# Patient Record
Sex: MALE | Race: WHITE | NOT HISPANIC OR LATINO | URBAN - NONMETROPOLITAN AREA
[De-identification: names, ages, dates, MRNs, and addresses within clinical notes are randomized per-mention and may not be internally consistent; named-entity substitution may affect disease eponyms.]

---

## 2023-07-25 ENCOUNTER — HOSPITAL ENCOUNTER (OUTPATIENT)
Dept: DATA CONVERSION | Facility: HOSPITAL | Age: 58
End: 2023-07-25
Attending: UROLOGY | Admitting: UROLOGY

## 2023-07-25 DIAGNOSIS — N20.0 CALCULUS OF KIDNEY: ICD-10-CM

## 2023-07-27 LAB
COMPLETE PATHOLOGY REPORT: NORMAL
CONVERTED CLINICAL DIAGNOSIS-HISTORY: NORMAL
CONVERTED FINAL DIAGNOSIS: NORMAL
CONVERTED FINAL REPORT PDF LINK TO COPY AND PASTE: NORMAL
CONVERTED GROSS DESCRIPTION: NORMAL

## 2023-08-01 ENCOUNTER — HOSPITAL ENCOUNTER (OUTPATIENT)
Dept: DATA CONVERSION | Facility: HOSPITAL | Age: 58
End: 2023-08-01
Attending: UROLOGY | Admitting: UROLOGY

## 2023-08-01 DIAGNOSIS — N13.30 UNSPECIFIED HYDRONEPHROSIS: ICD-10-CM

## 2023-08-01 DIAGNOSIS — N20.0 CALCULUS OF KIDNEY: ICD-10-CM

## 2023-08-07 LAB
CALCULI COMPOSITION: NORMAL
CALCULI DESCRIPTION: NORMAL
CALCULI MASS: 17 MG

## 2023-09-29 VITALS — WEIGHT: 183.64 LBS | BODY MASS INDEX: 28.82 KG/M2 | HEIGHT: 67 IN

## 2023-09-30 NOTE — H&P
History & Physical Reviewed:   I have reviewed the History and Physical dated:  24-Jul-2023   History and Physical reviewed and relevant findings noted. Patient examined to review pertinent physical  findings.: No significant changes   Home Medications Reviewed: no changes noted   Allergies Reviewed: no changes noted       ERAS (Enhanced Recovery After Surgery):  ·  ERAS Patient: no     Consent:   COVID-19 Consent:  ·  COVID-19 Risk Consent Surgeon has reviewed key risks related to the risk of misa COVID-19 and if they contract COVID-19 what the risks are.       Electronic Signatures:  Anil Larsen)  (Signed 25-Jul-2023 07:53)   Authored: History & Physical Reviewed, ERAS, Consent,  Note Completion      Last Updated: 25-Jul-2023 07:53 by Anil Larsen)

## 2023-09-30 NOTE — H&P
History & Physical Reviewed:   I have reviewed the History and Physical dated:  27-Jul-2023   History and Physical reviewed and relevant findings noted. Patient examined to review pertinent physical  findings.: No significant changes   Home Medications Reviewed: no changes noted   Allergies Reviewed: no changes noted       ERAS (Enhanced Recovery After Surgery):  ·  ERAS Patient: no     Consent:   COVID-19 Consent:  ·  COVID-19 Risk Consent Surgeon has reviewed key risks related to the risk of misa COVID-19 and if they contract COVID-19 what the risks are.       Electronic Signatures:  Anil Larsen)  (Signed 01-Aug-2023 07:37)   Authored: History & Physical Reviewed, ERAS, Consent,  Note Completion      Last Updated: 01-Aug-2023 07:37 by Anil Larsen)

## 2023-10-01 NOTE — OP NOTE
PROCEDURE DETAILS    Preoperative Diagnosis:  Calculus of left kidney, N20.0  Unspecified hydronephrosis, N13.30    Postoperative Diagnosis:  Calculus of left kidney, N20.0  Unspecified hydronephrosis, N13.30    Surgeon: Anil Larsen  Resident/Fellow/Other Assistant: None of these were associated with this case    Procedure:  1. L ESWL, CYSTO BILAT RPG BILAT URETEROSCOPY BILAT STENT removal    Anesthesia: No anesthesiologist associated with this case  Estimated Blood Loss: 0  Findings: see op note  Specimens(s) Collected: no,           Operative Report:   Preoperative diagnosis:  bilateral hydronephrosis, left renal stone  Postoperative diagnosis: Same  Procedure: cystoscopy with bilateral RPG and ureteroscopy and bilateral stent removal, left ESWL  Physician: ANDRES  Anesthesia: Gen.  Estimated blood loss: None  Indications and consent: The patient presents with known hydronephrosis. After the risks, benefits, alternatives, and indications of the procedure were explained to the patient they consented.  Procedure: The patient was brought to the operating room and placed on the table in the supine position. After adequate anesthesia was obtained, the patient was prepped and draped in the standard surgical fashion. First, the cystoscope was inserted into  the bladder. Formal cystoscopy was performed. The previously placed stent was seen and grasped and brought out. A guidewire was placed up the existing stent into the renal pelvis using X-Ray guidance. The stent was then removed.  the same procedure was  performed on the left. We then performed ureteroscopy with retrograde pyelogram. This did not show any evidence of mass or stone in the ureter. The retrograde pyelogram did not show hydronephrosis. a left ESWL was performed 2500 shocks were delivered..The  patient tolerated the procedure well and there no complications.    follow up 3 months with KUB                        Attestation:   Note Completion:  Attending  Attestation I performed the procedure without a resident         Electronic Signatures:  Anil Larsen (MD)  (Signed 01-Aug-2023 09:03)   Authored: Post-Operative Note, Chart Review, Note Completion      Last Updated: 01-Aug-2023 09:03 by Anil Larsen)

## 2023-10-02 NOTE — OP NOTE
PROCEDURE DETAILS    Preoperative Diagnosis:  Calculus of left kidney, N20.0    Postoperative Diagnosis:  Calculus of left kidney, N20.0    Surgeon: Anil Larsen  Resident/Fellow/Other Assistant: None of these were associated with this case    Procedure:  1. CYSTO BILAT RPG BILAT FLEXIBLE URETEROSCOPY W STONE MANIPULATION BILAT STENTS    Anesthesia: No anesthesiologist associated with this case  Estimated Blood Loss: 0  Findings: see op note  Specimens(s) Collected: no,           Operative Report:   Preoperative diagnosis:  Bilateral Flank Pain  Postoperative diagnosis: Same  Procedure: cystoscopy with bilateral RPG and ureteroscopy with stone extraction and Bilateral stent  placement  Physician: ANDRES  Anesthesia: Gen.  Estimated blood loss: None  Indications and consent: The patient presents with known stones and Flank Pain. After the risks, benefits, alternatives, and indications of the procedure were explained to the patient they consented.  Procedure: The patient was brought to the operating room and placed on the table in the supine position. After adequate anesthesia was obtained, the patient was prepped and draped in the standard surgical fashion. First, the cystoscope was inserted into  the bladder. Formal cystoscopy was performed.   We then performed  bilateral ureteroscopy with retrograde pyelograms. There was definite Left Hydro with renal stones. Ureteroscopy also showed a ureteral stone and this was basketed and sent for analysis.   Similar findings were found on the RIght but no obvious renal stones .  At this point  BILATERAL  stents were placed over the existing guidewires. This was done under fluoroscopic vision. The patient tolerated the procedure well and there no complications.    F/U WITH CT STONE STUDY  WIll definitely need F/U L ESWL WIll allow CT to evaluate right sided stone burden                        Attestation:   Note Completion:  Attending Attestation I performed the procedure  without a resident         Electronic Signatures:  Anil Larsen)  (Signed 25-Jul-2023 10:18)   Authored: Post-Operative Note, Chart Review, Note Completion      Last Updated: 25-Jul-2023 10:18 by Anil Larsen)

## 2023-10-06 ENCOUNTER — APPOINTMENT (OUTPATIENT)
Dept: URBAN - METROPOLITAN AREA CLINIC 204 | Age: 58
Setting detail: DERMATOLOGY
End: 2023-10-06

## 2023-10-06 DIAGNOSIS — L82.1 OTHER SEBORRHEIC KERATOSIS: ICD-10-CM

## 2023-10-06 DIAGNOSIS — L66.1 LICHEN PLANOPILARIS: ICD-10-CM

## 2023-10-06 DIAGNOSIS — D22 MELANOCYTIC NEVI: ICD-10-CM

## 2023-10-06 DIAGNOSIS — L57.8 OTHER SKIN CHANGES DUE TO CHRONIC EXPOSURE TO NONIONIZING RADIATION: ICD-10-CM

## 2023-10-06 PROBLEM — D22.5 MELANOCYTIC NEVI OF TRUNK: Status: ACTIVE | Noted: 2023-10-06

## 2023-10-06 PROCEDURE — OTHER PRESCRIPTION MEDICATION MANAGEMENT: OTHER

## 2023-10-06 PROCEDURE — OTHER COUNSELING: OTHER

## 2023-10-06 PROCEDURE — OTHER PRESCRIPTION: OTHER

## 2023-10-06 PROCEDURE — 99214 OFFICE O/P EST MOD 30 MIN: CPT

## 2023-10-06 PROCEDURE — OTHER MIPS QUALITY: OTHER

## 2023-10-06 RX ORDER — DOXYCYCLINE HYCLATE 100 MG/1
1 CAPSULE CAPSULE, GELATIN COATED ORAL TWICE A DAY
Qty: 60 | Refills: 5 | Status: ERX | COMMUNITY
Start: 2023-10-06

## 2023-10-06 RX ORDER — CLOBETASOL PROPIONATE 0.5 MG/ML
SOLUTION TOPICAL QHS
Qty: 50 | Refills: 3 | Status: ERX | COMMUNITY
Start: 2023-10-06

## 2023-10-06 RX ORDER — FLUOCINOLONE ACETONIDE 0.11 MG/ML
SMALL AMOUNT OIL TOPICAL
Qty: 118.28 | Refills: 5 | Status: ERX | COMMUNITY
Start: 2023-10-06

## 2023-10-06 RX ORDER — PREDNISONE 10 MG/1
TABLET ORAL ONCE A DAY
Qty: 35 | Refills: 0 | Status: ERX | COMMUNITY
Start: 2023-10-06

## 2023-10-06 ASSESSMENT — LOCATION ZONE DERM
LOCATION ZONE: SCALP
LOCATION ZONE: TRUNK

## 2023-10-06 ASSESSMENT — LOCATION SIMPLE DESCRIPTION DERM
LOCATION SIMPLE: RIGHT UPPER BACK
LOCATION SIMPLE: POSTERIOR SCALP
LOCATION SIMPLE: LEFT UPPER BACK

## 2023-10-06 ASSESSMENT — LOCATION DETAILED DESCRIPTION DERM
LOCATION DETAILED: LEFT MID-UPPER BACK
LOCATION DETAILED: MID-OCCIPITAL SCALP
LOCATION DETAILED: LEFT SUPERIOR UPPER BACK
LOCATION DETAILED: RIGHT SUPERIOR UPPER BACK

## 2023-10-06 NOTE — HPI: SECONDARY COMPLAINT
How Severe Is This Condition?: mild
Additional History: PCP gave him Scalpcin-and that was too strong.

## 2023-10-06 NOTE — PROCEDURE: PRESCRIPTION MEDICATION MANAGEMENT
Initiate Treatment: Start a 30day prednisone taper. Take doxycycline 100mg 1 tab BID. Apply Dermasmoothe oil to the scalp 2-3 times a week. Apply clobetasol scalp solution 2 times a day 4-5 drops.
Detail Level: Zone
Render In Strict Bullet Format?: No

## 2024-03-21 DIAGNOSIS — R35.0 URINARY FREQUENCY: Primary | ICD-10-CM

## 2024-03-21 RX ORDER — TAMSULOSIN HYDROCHLORIDE 0.4 MG/1
0.4 CAPSULE ORAL DAILY
Qty: 90 CAPSULE | Refills: 2 | Status: CANCELLED | OUTPATIENT
Start: 2024-03-21

## 2024-03-21 RX ORDER — TAMSULOSIN HYDROCHLORIDE 0.4 MG/1
0.4 CAPSULE ORAL DAILY
COMMUNITY
Start: 2024-02-19 | End: 2024-03-25 | Stop reason: SDUPTHER

## 2024-03-25 DIAGNOSIS — N40.1 BENIGN PROSTATIC HYPERPLASIA WITH LOWER URINARY TRACT SYMPTOMS, SYMPTOM DETAILS UNSPECIFIED: ICD-10-CM

## 2024-03-25 DIAGNOSIS — N40.1 BENIGN PROSTATIC HYPERPLASIA WITH LOWER URINARY TRACT SYMPTOMS, SYMPTOM DETAILS UNSPECIFIED: Primary | ICD-10-CM

## 2024-03-25 RX ORDER — TAMSULOSIN HYDROCHLORIDE 0.4 MG/1
0.4 CAPSULE ORAL DAILY
Qty: 90 CAPSULE | Refills: 3 | Status: SHIPPED | OUTPATIENT
Start: 2024-03-25 | End: 2024-03-25 | Stop reason: SDUPTHER

## 2024-03-25 RX ORDER — TAMSULOSIN HYDROCHLORIDE 0.4 MG/1
0.4 CAPSULE ORAL DAILY
Qty: 90 CAPSULE | Refills: 3 | Status: SHIPPED | OUTPATIENT
Start: 2024-03-25 | End: 2024-06-23

## 2024-04-04 ENCOUNTER — APPOINTMENT (OUTPATIENT)
Dept: URBAN - METROPOLITAN AREA CLINIC 204 | Age: 59
Setting detail: DERMATOLOGY
End: 2024-04-04

## 2024-04-04 DIAGNOSIS — L82.1 OTHER SEBORRHEIC KERATOSIS: ICD-10-CM

## 2024-04-04 DIAGNOSIS — D18.0 HEMANGIOMA: ICD-10-CM

## 2024-04-04 DIAGNOSIS — L66.1 LICHEN PLANOPILARIS: ICD-10-CM

## 2024-04-04 DIAGNOSIS — L57.8 OTHER SKIN CHANGES DUE TO CHRONIC EXPOSURE TO NONIONIZING RADIATION: ICD-10-CM

## 2024-04-04 PROBLEM — D18.01 HEMANGIOMA OF SKIN AND SUBCUTANEOUS TISSUE: Status: ACTIVE | Noted: 2024-04-04

## 2024-04-04 PROCEDURE — OTHER PRESCRIPTION: OTHER

## 2024-04-04 PROCEDURE — OTHER ADDITIONAL NOTES: OTHER

## 2024-04-04 PROCEDURE — OTHER COUNSELING: OTHER

## 2024-04-04 PROCEDURE — OTHER MIPS QUALITY: OTHER

## 2024-04-04 PROCEDURE — OTHER PRESCRIPTION MEDICATION MANAGEMENT: OTHER

## 2024-04-04 PROCEDURE — 99214 OFFICE O/P EST MOD 30 MIN: CPT

## 2024-04-04 RX ORDER — DOXYCYCLINE HYCLATE 100 MG/1
1 CAPSULE CAPSULE, GELATIN COATED ORAL
Qty: 30 | Refills: 5 | Status: ERX

## 2024-04-04 RX ORDER — FLUOCINOLONE ACETONIDE 0.11 MG/ML
SMALL AMOUNT OIL TOPICAL
Qty: 118.28 | Refills: 5 | Status: ERX

## 2024-04-04 RX ORDER — PREDNISONE 10 MG/1
TABLET ORAL ONCE A DAY
Qty: 35 | Refills: 0 | Status: ERX

## 2024-04-04 RX ORDER — CLOBETASOL PROPIONATE 0.5 MG/ML
SOLUTION TOPICAL QHS
Qty: 50 | Refills: 3 | Status: ERX

## 2024-04-04 ASSESSMENT — LOCATION ZONE DERM
LOCATION ZONE: SCALP
LOCATION ZONE: TRUNK
LOCATION ZONE: FACE

## 2024-04-04 ASSESSMENT — LOCATION DETAILED DESCRIPTION DERM
LOCATION DETAILED: STERNUM
LOCATION DETAILED: RIGHT INFERIOR FOREHEAD
LOCATION DETAILED: LEFT INFERIOR UPPER BACK
LOCATION DETAILED: RIGHT SUPERIOR UPPER BACK
LOCATION DETAILED: MID-OCCIPITAL SCALP
LOCATION DETAILED: LEFT MID-UPPER BACK

## 2024-04-04 ASSESSMENT — LOCATION SIMPLE DESCRIPTION DERM
LOCATION SIMPLE: RIGHT UPPER BACK
LOCATION SIMPLE: RIGHT FOREHEAD
LOCATION SIMPLE: POSTERIOR SCALP
LOCATION SIMPLE: LEFT UPPER BACK
LOCATION SIMPLE: CHEST

## 2024-04-04 ASSESSMENT — SEVERITY ASSESSMENT: SEVERITY: ALMOST CLEAR

## 2024-04-04 NOTE — PROCEDURE: PRESCRIPTION MEDICATION MANAGEMENT
Continue Regimen: Contains to apply Dermasmoothe oil to the scalp 2-3 times a week as needed. Continue to Apply clobetasol scalp solution as needed
Initiate Treatment: Start another round of a 30 day prednisone taper
Modify Regimen: Reduce doxycycline from 100mg 1 capsule BID to doxycycline 100mg 1 capsule once a day x2 months, then in June reduce to take 100mg 1 capsule 3x a week.
Detail Level: Zone
Render In Strict Bullet Format?: No

## 2024-09-04 ENCOUNTER — APPOINTMENT (OUTPATIENT)
Dept: URBAN - METROPOLITAN AREA CLINIC 204 | Age: 59
Setting detail: DERMATOLOGY
End: 2024-09-04

## 2024-09-04 DIAGNOSIS — L66.1 LICHEN PLANOPILARIS: ICD-10-CM

## 2024-09-04 PROCEDURE — 99213 OFFICE O/P EST LOW 20 MIN: CPT

## 2024-09-04 PROCEDURE — OTHER COUNSELING: OTHER

## 2024-09-04 PROCEDURE — OTHER PRESCRIPTION: OTHER

## 2024-09-04 PROCEDURE — OTHER PRESCRIPTION MEDICATION MANAGEMENT: OTHER

## 2024-09-04 PROCEDURE — OTHER MIPS QUALITY: OTHER

## 2024-09-04 RX ORDER — FLUOCINOLONE ACETONIDE 0.11 MG/ML
SMALL AMOUNT OIL TOPICAL
Qty: 118.28 | Refills: 7 | Status: ERX

## 2024-09-04 RX ORDER — DOXYCYCLINE HYCLATE 100 MG/1
1 CAPSULE CAPSULE, GELATIN COATED ORAL
Qty: 30 | Refills: 7 | Status: ERX

## 2024-09-04 ASSESSMENT — LOCATION ZONE DERM: LOCATION ZONE: SCALP

## 2024-09-04 ASSESSMENT — LOCATION SIMPLE DESCRIPTION DERM: LOCATION SIMPLE: POSTERIOR SCALP

## 2024-09-04 ASSESSMENT — LOCATION DETAILED DESCRIPTION DERM: LOCATION DETAILED: MID-OCCIPITAL SCALP

## 2024-09-04 NOTE — PROCEDURE: PRESCRIPTION MEDICATION MANAGEMENT
Continue Regimen: apply Dermasmoothe oil to the scalp 2-3 times a week as needed. Continue to Apply clobetasol scalp solution as needed
Initiate Treatment: Wash scalp with head and shoulders shampoo
Modify Regimen: Reduce doxycycline from 100mg 1 capsule once a day to doxycycline 100mg 1 capsule 3x a week.
Detail Level: Zone
Render In Strict Bullet Format?: No

## 2025-03-03 DIAGNOSIS — N40.1 BENIGN PROSTATIC HYPERPLASIA WITH LOWER URINARY TRACT SYMPTOMS, SYMPTOM DETAILS UNSPECIFIED: ICD-10-CM

## 2025-03-05 RX ORDER — TAMSULOSIN HYDROCHLORIDE 0.4 MG/1
0.4 CAPSULE ORAL DAILY
Qty: 90 CAPSULE | Refills: 3 | Status: SHIPPED | OUTPATIENT
Start: 2025-03-05